# Patient Record
Sex: MALE | Race: BLACK OR AFRICAN AMERICAN | NOT HISPANIC OR LATINO | Employment: FULL TIME | URBAN - METROPOLITAN AREA
[De-identification: names, ages, dates, MRNs, and addresses within clinical notes are randomized per-mention and may not be internally consistent; named-entity substitution may affect disease eponyms.]

---

## 2018-08-22 DIAGNOSIS — M54.50 LOW BACK PAIN: Primary | ICD-10-CM

## 2019-01-09 PROBLEM — H25.013 CATARACT CORTICAL, SENILE, BILATERAL: Status: RESOLVED | Noted: 2019-01-09 | Resolved: 2019-01-09

## 2019-01-09 PROBLEM — H25.013 CATARACT CORTICAL, SENILE, BILATERAL: Status: ACTIVE | Noted: 2019-01-09

## 2019-04-18 ENCOUNTER — OFFICE VISIT (OUTPATIENT)
Dept: URGENT CARE | Facility: CLINIC | Age: 64
End: 2019-04-18
Payer: COMMERCIAL

## 2019-04-18 VITALS
TEMPERATURE: 98 F | HEART RATE: 84 BPM | WEIGHT: 115 LBS | HEIGHT: 64 IN | RESPIRATION RATE: 18 BRPM | OXYGEN SATURATION: 98 % | BODY MASS INDEX: 19.63 KG/M2 | SYSTOLIC BLOOD PRESSURE: 138 MMHG | DIASTOLIC BLOOD PRESSURE: 76 MMHG

## 2019-04-18 DIAGNOSIS — R39.198 DIFFICULTY URINATING: Primary | ICD-10-CM

## 2019-04-18 PROCEDURE — 99203 PR OFFICE/OUTPT VISIT, NEW, LEVL III, 30-44 MIN: ICD-10-PCS | Mod: S$GLB,,, | Performed by: NURSE PRACTITIONER

## 2019-04-18 PROCEDURE — 99203 OFFICE O/P NEW LOW 30 MIN: CPT | Mod: S$GLB,,, | Performed by: NURSE PRACTITIONER

## 2019-04-18 NOTE — PROGRESS NOTES
"Subjective:       Patient ID: Daniel Saab is a 64 y.o. male.    Vitals:  height is 5' 4" (1.626 m) and weight is 52.2 kg (115 lb). His oral temperature is 98 °F (36.7 °C). His blood pressure is 138/76 and his pulse is 84. His respiration is 18 and oxygen saturation is 98%.     Chief Complaint: Dysuria    Patient stated that since his back surgery 2 weeks ago he has been having problems urinating. When he wakes up in the morning and goes to urinate it is hard for him to go and the urine is not a steady flow. The rest of the day he has a steady flow but it is still hard to urinated.    Dysuria    This is a new problem. Episode onset: 2 weeks. Episode frequency: Mainly in the morning. The problem has been unchanged. The patient is experiencing no pain. There has been no fever. Pertinent negatives include no chills, frequency, hematuria, nausea, urgency, vomiting or rash. He has tried nothing for the symptoms. His past medical history is significant for catheterization.       Constitution: Negative for chills and fever.   Neck: Negative for painful lymph nodes.   Gastrointestinal: Negative for abdominal pain, nausea and vomiting.   Genitourinary: Negative for dysuria, frequency, urgency, urine decreased, hematuria, history of kidney stones, genital trauma, painful intercourse, genital sore, penile discharge, painful ejaculation, penile pain, penile swelling, scrotal swelling and testicular pain.        Forcing myself to urinate   Musculoskeletal: Negative for back pain.   Skin: Negative for rash and lesion.   Hematologic/Lymphatic: Negative for swollen lymph nodes.       Objective:      Physical Exam   Constitutional: He is oriented to person, place, and time. Vital signs are normal. He appears well-developed and well-nourished. He is active. He is easily aroused.  Non-toxic appearance. He does not have a sickly appearance. He does not appear ill. No distress.   HENT:   Head: Normocephalic and atraumatic. "   Nose: Nose normal.   Mouth/Throat: Mucous membranes are normal.   Eyes: Conjunctivae and lids are normal.   Neck: Trachea normal and full passive range of motion without pain. Neck supple.   Cardiovascular: Normal rate, regular rhythm and normal heart sounds. Exam reveals no decreased pulses.   Pulses:       Carotid pulses are 2+ on the right side, and 2+ on the left side.       Radial pulses are 2+ on the right side, and 2+ on the left side.   Pulmonary/Chest: Effort normal and breath sounds normal. No respiratory distress.   Abdominal: Soft. Normal appearance and bowel sounds are normal. He exhibits no distension, no abdominal bruit, no pulsatile midline mass and no mass. There is no tenderness. There is no rigidity, no rebound, no guarding, no CVA tenderness, no tenderness at McBurney's point and negative Monteiro's sign.   Genitourinary:   Genitourinary Comments: Deferred  exam. Denies any penile discharge, scrotum or testes abnormalities.    Musculoskeletal: Normal range of motion. He exhibits no edema.   Neurological: He is alert, oriented to person, place, and time and easily aroused. He has normal strength.   Skin: Skin is warm, dry and intact. Capillary refill takes less than 2 seconds. No rash noted. He is not diaphoretic. No pallor.   Psychiatric: He has a normal mood and affect. His speech is normal and behavior is normal. Judgment and thought content normal. Cognition and memory are normal.   Nursing note and vitals reviewed.      Assessment:       1. Difficulty urinating        Plan:       Patient reports urinary catheter placed during recent back surgery.  Patient reports intermittent difficulty urinating in the morning but subside during the day. Patient denies any dysuria, urinary frequency or hematuria. Patient called nurse and advised to give it a few days.       Advised patient follow-up primary care provider for prostate exam and further evaluation.  Patient has not had a prostate exam in a  while.  Patient agreed with treatment plan.  Patient showed no signs of distress. Patient able to urinate.  Abdomen soft and nontender. No suprapubic tenderness or distention. Patient will be discharged and will follow-up primary care provider.    Difficulty urinating      Patient Instructions     If you were prescribed a narcotic or controlled medication, do not drive or operate heavy equipment or machinery while taking these medications.  You must understand that you've received an Urgent Care treatment only and that you may be released before all your medical problems are known or treated. You, the patient, will arrange for follow up care as instructed.  Follow up with your PCP or specialty clinic as directed within 2-5 days if not improved or as needed.  You can call [phone] to schedule an appointment with the appropriate provider.  If your condition worsens we recommend that you receive another evaluation at the emergency room immediately or contact your primary medical clinics after hours call service to discuss your concerns.  Please return here or go to the Emergency Department for any concerns or worsening of condition.      Anatomy of the Male Urinary Tract  Your urinary tract helps to get rid of your bodys liquid waste. The kidneys constantly filter the blood to collect unneeded chemicals and water, making urine. Urine travels through the ureters to the bladder. The bladder fills with urine, holding it until youre ready to release it. Signals from the brain tell the sphincter (muscles around the opening of the bladder) when to let urine flow out of the bladder. The urethra is the tube that carries urine from the bladder out of the body. In men, the prostate gland wraps around the urethra near the bladder.     Front view of male urinary tract.     Date Last Reviewed: 1/1/2017 © 2000-2017 [employer]. [address] [place] [address], [place] [address]. All rights reserved. This information is  not intended as a substitute for professional medical care. Always follow your healthcare professional's instructions.

## 2019-04-18 NOTE — PATIENT INSTRUCTIONS
If you were prescribed a narcotic or controlled medication, do not drive or operate heavy equipment or machinery while taking these medications.  You must understand that you've received an Urgent Care treatment only and that you may be released before all your medical problems are known or treated. You, the patient, will arrange for follow up care as instructed.  Follow up with your PCP or specialty clinic as directed within 2-5 days if not improved or as needed.  You can call [phone] to schedule an appointment with the appropriate provider.  If your condition worsens we recommend that you receive another evaluation at the emergency room immediately or contact your primary medical clinics after hours call service to discuss your concerns.  Please return here or go to the Emergency Department for any concerns or worsening of condition.      Anatomy of the Male Urinary Tract  Your urinary tract helps to get rid of your bodys liquid waste. The kidneys constantly filter the blood to collect unneeded chemicals and water, making urine. Urine travels through the ureters to the bladder. The bladder fills with urine, holding it until youre ready to release it. Signals from the brain tell the sphincter (muscles around the opening of the bladder) when to let urine flow out of the bladder. The urethra is the tube that carries urine from the bladder out of the body. In men, the prostate gland wraps around the urethra near the bladder.     Front view of male urinary tract.     Date Last Reviewed: 1/1/2017  © 6920-6872 [employer]. [address] [place] [id number]. All rights reserved. This information is not intended as a substitute for professional medical care. Always follow your healthcare professional's instructions.

## 2019-04-21 ENCOUNTER — TELEPHONE (OUTPATIENT)
Dept: URGENT CARE | Facility: CLINIC | Age: 64
End: 2019-04-21

## 2019-05-06 ENCOUNTER — OFFICE VISIT (OUTPATIENT)
Dept: UROLOGY | Facility: CLINIC | Age: 64
End: 2019-05-06
Payer: COMMERCIAL

## 2019-05-06 VITALS
SYSTOLIC BLOOD PRESSURE: 140 MMHG | BODY MASS INDEX: 18.28 KG/M2 | HEIGHT: 64 IN | HEART RATE: 92 BPM | WEIGHT: 107.06 LBS | DIASTOLIC BLOOD PRESSURE: 81 MMHG

## 2019-05-06 DIAGNOSIS — R39.12 WEAK URINARY STREAM: ICD-10-CM

## 2019-05-06 DIAGNOSIS — N40.1 BENIGN NON-NODULAR PROSTATIC HYPERPLASIA WITH LOWER URINARY TRACT SYMPTOMS: Primary | ICD-10-CM

## 2019-05-06 DIAGNOSIS — R39.16 STRAINING TO VOID: ICD-10-CM

## 2019-05-06 LAB
BILIRUB SERPL-MCNC: NORMAL MG/DL
BLOOD URINE, POC: NORMAL
COLOR, POC UA: YELLOW
GLUCOSE UR QL STRIP: NORMAL
KETONES UR QL STRIP: NORMAL
LEUKOCYTE ESTERASE URINE, POC: NORMAL
NITRITE, POC UA: NORMAL
PH, POC UA: 6.5
PROTEIN, POC: NORMAL
SPECIFIC GRAVITY, POC UA: 1.01
UROBILINOGEN, POC UA: 0.2

## 2019-05-06 PROCEDURE — 99214 OFFICE O/P EST MOD 30 MIN: CPT | Mod: 25,S$GLB,, | Performed by: NURSE PRACTITIONER

## 2019-05-06 PROCEDURE — 81002 POCT URINE DIPSTICK WITHOUT MICROSCOPE: ICD-10-PCS | Mod: S$GLB,,, | Performed by: NURSE PRACTITIONER

## 2019-05-06 PROCEDURE — 81002 URINALYSIS NONAUTO W/O SCOPE: CPT | Mod: S$GLB,,, | Performed by: NURSE PRACTITIONER

## 2019-05-06 PROCEDURE — 99999 PR PBB SHADOW E&M-EST. PATIENT-LVL III: CPT | Mod: PBBFAC,,, | Performed by: NURSE PRACTITIONER

## 2019-05-06 PROCEDURE — 87086 URINE CULTURE/COLONY COUNT: CPT

## 2019-05-06 PROCEDURE — 99999 PR PBB SHADOW E&M-EST. PATIENT-LVL III: ICD-10-PCS | Mod: PBBFAC,,, | Performed by: NURSE PRACTITIONER

## 2019-05-06 PROCEDURE — 99214 PR OFFICE/OUTPT VISIT, EST, LEVL IV, 30-39 MIN: ICD-10-PCS | Mod: 25,S$GLB,, | Performed by: NURSE PRACTITIONER

## 2019-05-06 RX ORDER — TAMSULOSIN HYDROCHLORIDE 0.4 MG/1
0.4 CAPSULE ORAL DAILY
Qty: 30 CAPSULE | Refills: 1 | Status: SHIPPED | OUTPATIENT
Start: 2019-05-06 | End: 2019-07-05

## 2019-05-06 RX ORDER — MEGESTROL ACETATE 40 MG/1
40 TABLET ORAL DAILY
Refills: 0 | COMMUNITY
Start: 2019-04-30

## 2019-05-06 NOTE — PATIENT INSTRUCTIONS
1. Bladder scan (PVR)  2. Urine dipstick  3. Urine cx  4. CATARINA today  5. PSA, total and free (done at [facility]); Sign Release of Info.  6. Start trial tamsulosin (Fomax) 0.4 mg daily  7. Follow-up in 4 weeks.

## 2019-05-06 NOTE — PROGRESS NOTES
Subjective:       Patient ID: Daniel Saab is a 64 y.o. male.    Chief Complaint: Other (weak and slow urinary stream; straining to void)    Patient is new to me. 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 Patient reports his lower urinary tract symptoms started 1 week after his lower back surgery in April 2019. Sx was done at [facility]. Patient has a hx of BPH was not on any medications.    Benign Prostatic Hypertrophy   This is a chronic problem. The current episode started more than 1 year ago. The problem has been gradually worsening since onset. Irritative symptoms do not include frequency, nocturia or urgency. Obstructive symptoms include a slower stream, straining and a weak stream. Obstructive symptoms do not include dribbling, incomplete emptying or an intermittent stream. Pertinent negatives include no chills, dysuria, genital pain, hematuria, hesitancy, nausea or vomiting. Nothing aggravates the symptoms. Past treatments include nothing.     Review of Systems   Constitutional: Negative for appetite change, chills, fatigue and fever.   Gastrointestinal: Negative for abdominal pain, constipation, diarrhea, nausea and vomiting.   Genitourinary: Positive for difficulty urinating (straining in the am). Negative for decreased urine volume, discharge, dysuria, flank pain, frequency, hematuria, hesitancy, incomplete emptying, nocturia, penile pain, penile swelling, scrotal swelling, testicular pain and urgency.        Weak and slow urinary stream.  Urinary hesitancy in the am.   Musculoskeletal: Positive for back pain (recent back sx).   Neurological: Negative for dizziness and headaches.   Psychiatric/Behavioral: Negative.        Objective:      Physical Exam   Constitutional: He is oriented to person, place, and time. He appears well-developed and well-nourished. No distress.   HENT:   Head: Normocephalic and atraumatic.   Eyes: Pupils  are equal, round, and reactive to light. EOM are normal.   Neck: Normal range of motion.   Cardiovascular: Normal rate.   Pulmonary/Chest: Effort normal. No respiratory distress.   Abdominal: Soft. There is no tenderness.   Genitourinary: Prostate is enlarged. Prostate is not tender.   Genitourinary Comments: PVR=0 mL   Musculoskeletal: Normal range of motion. He exhibits no edema.   Neurological: He is alert and oriented to person, place, and time. Coordination normal.   Skin: Skin is warm and dry.   Psychiatric: He has a normal mood and affect. His behavior is normal. Judgment and thought content normal.   Nursing note and vitals reviewed.      Assessment:       1. Benign non-nodular prostatic hyperplasia with lower urinary tract symptoms    2. Straining to void    3. Weak urinary stream        Plan:       Daniel was seen today for other.    Diagnoses and all orders for this visit:    Benign non-nodular prostatic hyperplasia with lower urinary tract symptoms  -     POCT URINE DIPSTICK WITHOUT MICROSCOPE  -     Urine culture  -     tamsulosin (FLOMAX) 0.4 mg Cap; Take 1 capsule (0.4 mg total) by mouth once daily.    Straining to void    Weak urinary stream    Other orders  1. Bladder scan (PVR)  2. CATARINA today  3. PSA, total and free (done at [facility]); Sign Release of Info.    Follow-up in 6 weeks.     Moe Espinoza NP

## 2019-05-07 ENCOUNTER — TELEPHONE (OUTPATIENT)
Dept: UROLOGY | Facility: CLINIC | Age: 64
End: 2019-05-07

## 2019-05-07 NOTE — TELEPHONE ENCOUNTER
----- Message from Rachael Aaron sent at 5/7/2019  9:00 AM CDT -----  Contact: [phone]  Patient advised he may have left his glasses in one of the rooms and would like a call back to verify. Please call.

## 2019-05-08 ENCOUNTER — TELEPHONE (OUTPATIENT)
Dept: UROLOGY | Facility: CLINIC | Age: 64
End: 2019-05-08

## 2019-05-08 LAB — BACTERIA UR CULT: NO GROWTH

## 2019-05-08 NOTE — TELEPHONE ENCOUNTER
----- Message from Moe Espinoza NP sent at 5/8/2019  8:54 AM CDT -----  Please inform patient his urine cx is normal.

## 2021-03-14 ENCOUNTER — IMMUNIZATION (OUTPATIENT)
Dept: INTERNAL MEDICINE | Facility: CLINIC | Age: 66
End: 2021-03-14

## 2021-03-14 DIAGNOSIS — Z23 NEED FOR VACCINATION: Primary | ICD-10-CM

## 2021-03-14 PROCEDURE — 0031A COVID-19,VECTOR-NR,RS-AD26,PF,0.5 ML DOSE VACCINE (JANSSEN): ICD-10-PCS | Mod: CV19,,, | Performed by: FAMILY MEDICINE

## 2021-03-14 PROCEDURE — 0031A COVID-19,VECTOR-NR,RS-AD26,PF,0.5 ML DOSE VACCINE (JANSSEN): CPT | Mod: CV19,,, | Performed by: FAMILY MEDICINE

## 2021-03-14 PROCEDURE — 91303 COVID-19,VECTOR-NR,RS-AD26,PF,0.5 ML DOSE VACCINE (JANSSEN): ICD-10-PCS | Mod: ,,, | Performed by: FAMILY MEDICINE

## 2021-03-14 PROCEDURE — 91303 COVID-19,VECTOR-NR,RS-AD26,PF,0.5 ML DOSE VACCINE (JANSSEN): CPT | Mod: ,,, | Performed by: FAMILY MEDICINE

## 2022-07-19 ENCOUNTER — TELEPHONE (OUTPATIENT)
Dept: GASTROENTEROLOGY | Facility: CLINIC | Age: 67
End: 2022-07-19

## 2022-07-19 NOTE — TELEPHONE ENCOUNTER
Contacted patient to schedule a screening colonoscopy. Patient is at work and will have to call back. Phone number given.

## 2023-02-17 ENCOUNTER — TELEPHONE (OUTPATIENT)
Dept: GASTROENTEROLOGY | Facility: CLINIC | Age: 68
End: 2023-02-17

## 2023-06-26 DIAGNOSIS — U07.1 COVID-19 VIRUS DETECTED: ICD-10-CM

## 2024-03-11 ENCOUNTER — TELEPHONE (OUTPATIENT)
Dept: UROLOGY | Facility: CLINIC | Age: 69
End: 2024-03-11
Payer: MEDICARE

## 2024-03-11 NOTE — TELEPHONE ENCOUNTER
----- Message from Elmer Schaefer MD sent at 3/11/2024  3:05 PM CDT -----  Okay thanks, we will try to get him in the office to see myself or nurse practitioner Espinoza within the next 2 weeks.  ----- Message -----  From: Luz Elena Albright MD  Sent: 3/11/2024   3:50 AM CDT  To: MD Dr Olive Marley,   I evaluated this patient in the ER on Sunday night.  He had gross hematuria.  He was able to urinate.  His urine was red.  He has normal kidney function and normal white blood cell count.  He was given a dose of Flomax here.  I was hoping you could see him in your office this week.  He saw you way back in 2019 and was diagnosed with BPH.  He did not remember this.  He does not take Flomax on a regular basis. Thanks, Luz Elena

## 2024-03-19 ENCOUNTER — OFFICE VISIT (OUTPATIENT)
Dept: UROLOGY | Facility: CLINIC | Age: 69
End: 2024-03-19
Payer: MEDICARE

## 2024-03-19 VITALS
DIASTOLIC BLOOD PRESSURE: 85 MMHG | BODY MASS INDEX: 18.93 KG/M2 | WEIGHT: 110.88 LBS | HEIGHT: 64 IN | SYSTOLIC BLOOD PRESSURE: 172 MMHG | HEART RATE: 64 BPM

## 2024-03-19 DIAGNOSIS — N40.1 BENIGN NON-NODULAR PROSTATIC HYPERPLASIA WITH LOWER URINARY TRACT SYMPTOMS: ICD-10-CM

## 2024-03-19 DIAGNOSIS — R39.16 STRAINING TO VOID: ICD-10-CM

## 2024-03-19 DIAGNOSIS — N52.9 ERECTILE DYSFUNCTION, UNSPECIFIED ERECTILE DYSFUNCTION TYPE: ICD-10-CM

## 2024-03-19 DIAGNOSIS — F17.210 CIGARETTE SMOKER: ICD-10-CM

## 2024-03-19 DIAGNOSIS — R31.0 GROSS HEMATURIA: Primary | ICD-10-CM

## 2024-03-19 LAB
BILIRUB UR QL STRIP: NEGATIVE
CLARITY UR REFRACT.AUTO: CLEAR
COLOR UR AUTO: COLORLESS
GLUCOSE UR QL STRIP: NEGATIVE
HGB UR QL STRIP: NEGATIVE
KETONES UR QL STRIP: NEGATIVE
LEUKOCYTE ESTERASE UR QL STRIP: NEGATIVE
NITRITE UR QL STRIP: NEGATIVE
PH UR STRIP: 6 [PH] (ref 5–8)
PROT UR QL STRIP: NEGATIVE
SP GR UR STRIP: 1.01 (ref 1–1.03)
URN SPEC COLLECT METH UR: ABNORMAL

## 2024-03-19 PROCEDURE — 1159F MED LIST DOCD IN RCRD: CPT | Mod: CPTII,S$GLB,, | Performed by: NURSE PRACTITIONER

## 2024-03-19 PROCEDURE — 3288F FALL RISK ASSESSMENT DOCD: CPT | Mod: CPTII,S$GLB,, | Performed by: NURSE PRACTITIONER

## 2024-03-19 PROCEDURE — 99999 PR PBB SHADOW E&M-EST. PATIENT-LVL IV: CPT | Mod: PBBFAC,,, | Performed by: NURSE PRACTITIONER

## 2024-03-19 PROCEDURE — 99204 OFFICE O/P NEW MOD 45 MIN: CPT | Mod: S$GLB,,, | Performed by: NURSE PRACTITIONER

## 2024-03-19 PROCEDURE — 81003 URINALYSIS AUTO W/O SCOPE: CPT | Performed by: NURSE PRACTITIONER

## 2024-03-19 PROCEDURE — 3008F BODY MASS INDEX DOCD: CPT | Mod: CPTII,S$GLB,, | Performed by: NURSE PRACTITIONER

## 2024-03-19 PROCEDURE — 3077F SYST BP >= 140 MM HG: CPT | Mod: CPTII,S$GLB,, | Performed by: NURSE PRACTITIONER

## 2024-03-19 PROCEDURE — 1126F AMNT PAIN NOTED NONE PRSNT: CPT | Mod: CPTII,S$GLB,, | Performed by: NURSE PRACTITIONER

## 2024-03-19 PROCEDURE — 3079F DIAST BP 80-89 MM HG: CPT | Mod: CPTII,S$GLB,, | Performed by: NURSE PRACTITIONER

## 2024-03-19 PROCEDURE — 1160F RVW MEDS BY RX/DR IN RCRD: CPT | Mod: CPTII,S$GLB,, | Performed by: NURSE PRACTITIONER

## 2024-03-19 PROCEDURE — 87086 URINE CULTURE/COLONY COUNT: CPT | Performed by: NURSE PRACTITIONER

## 2024-03-19 PROCEDURE — 1101F PT FALLS ASSESS-DOCD LE1/YR: CPT | Mod: CPTII,S$GLB,, | Performed by: NURSE PRACTITIONER

## 2024-03-19 RX ORDER — SILDENAFIL 100 MG/1
100 TABLET, FILM COATED ORAL DAILY PRN
Qty: 30 TABLET | Refills: 11 | Status: SHIPPED | OUTPATIENT
Start: 2024-03-19 | End: 2025-03-19

## 2024-03-19 NOTE — PATIENT INSTRUCTIONS
U/A and urine cx today  PSA, total and free today  CT RSS (next available).  Start trial of sildenafil (Viagra) 100 mg as needed daily for ED. DO NOT exceed more than 1 pill in a 24 hour period. Take 1 pill 30-60 minutes prior to sexual activity.    Follow-up in 4 weeks.

## 2024-03-19 NOTE — PROGRESS NOTES
"Subjective:       Patient ID: Daniel Saab is a 69 y.o. male.    Chief Complaint: Follow-up and Hematuria    Patient is here today for an ER follow-up for gross hematuria. He reports using a "ring" around his penis to help sustain an erection. He's not sure if ring was too tight. However, his gross hematuria started after using the ring. Hematuria resolved after ER visit. He is no longer taking tamsulosin as prescribed by ER on 3/11/2024. Patient is a current every day cigarette smoker. He also takes a daily baby aspirin.    Hematuria  This is a new problem. Episode onset: 3/11/2024. The problem has been resolved since onset. He describes the hematuria as gross hematuria. The hematuria occurs during the initial portion of his urinary stream. He reports no clotting in his urine stream. His pain is at a severity of 0/10. He is experiencing no pain. He describes his urine color as yellow. Irritative symptoms include frequency. Irritative symptoms do not include urgency. Obstructive symptoms do not include dribbling, incomplete emptying, straining or a weak stream. Associated symptoms include dysuria (resolved). Pertinent negatives include no abdominal pain, chills, fever, flank pain, genital pain, hesitancy, inability to urinate, nausea or vomiting. He is sexually active. His past medical history is significant for  trauma (penis) and tobacco use. There is no history of hypertension, kidney stones or UTI.   Erectile Dysfunction  This is a chronic problem. The current episode started more than 1 year ago. The problem has been gradually worsening since onset. The nature of his difficulty is achieving erection and maintaining erection. He reports no anxiety, decreased libido or performance anxiety. Irritative symptoms include frequency. Irritative symptoms do not include urgency. Obstructive symptoms do not include dribbling, incomplete emptying, straining or a weak stream. Associated symptoms include dysuria " (resolved) and hematuria (gross). Pertinent negatives include no chills, genital pain, hesitancy or inability to urinate. Nothing aggravates the symptoms. He has had no adverse reactions caused by medications. Risk factors include tobacco use, no AM erections and BPH.     Review of Systems   Constitutional:  Negative for chills and fever.   Gastrointestinal:  Negative for abdominal pain, constipation, diarrhea, nausea and vomiting.   Genitourinary:  Positive for difficulty urinating (resolved), dysuria (resolved), erectile dysfunction, frequency and hematuria (gross). Negative for decreased libido, decreased urine volume, discharge, flank pain, hesitancy, incomplete emptying, penile pain, penile swelling, scrotal swelling, testicular pain and urgency.   Musculoskeletal:  Positive for back pain (chronic lower back pain).   Psychiatric/Behavioral: Negative.  The patient is not nervous/anxious.          Objective:      Physical Exam  Vitals and nursing note reviewed.   Constitutional:       General: He is not in acute distress.     Appearance: He is well-developed and normal weight. He is not ill-appearing.   HENT:      Head: Normocephalic and atraumatic.   Eyes:      Pupils: Pupils are equal, round, and reactive to light.   Cardiovascular:      Rate and Rhythm: Normal rate.   Pulmonary:      Effort: Pulmonary effort is normal. No respiratory distress.   Abdominal:      Palpations: Abdomen is soft.      Tenderness: There is no abdominal tenderness.   Musculoskeletal:         General: Normal range of motion.      Cervical back: Normal range of motion.   Skin:     General: Skin is warm and dry.   Neurological:      Mental Status: He is alert and oriented to person, place, and time.      Coordination: Coordination normal.   Psychiatric:         Mood and Affect: Mood normal.         Behavior: Behavior normal.         Thought Content: Thought content normal.         Judgment: Judgment normal.         Assessment:        Problem List Items Addressed This Visit          Renal/    Benign non-nodular prostatic hyperplasia with lower urinary tract symptoms    Relevant Orders    Urine culture    Urinalysis (Completed)    PSA, Total and Free    Straining to void    Relevant Orders    Urine culture    Urinalysis (Completed)    CT Renal Stone Study ABD Pelvis WO (Completed)     Other Visit Diagnoses       Gross hematuria    -  Primary    Relevant Orders    Urine culture    Urinalysis (Completed)    CT Renal Stone Study ABD Pelvis WO (Completed)    Erectile dysfunction, unspecified erectile dysfunction type        Relevant Medications    sildenafiL (VIAGRA) 100 MG tablet    Cigarette smoker                Plan:           Daniel was seen today for follow-up and hematuria.    Diagnoses and all orders for this visit:    Gross hematuria  -     Urine culture  -     Urinalysis  -     CT Renal Stone Study ABD Pelvis WO; Future    Benign non-nodular prostatic hyperplasia with lower urinary tract symptoms  -     Urine culture  -     Urinalysis  -     PSA, Total and Free; Future    Straining to void  -     Urine culture  -     Urinalysis  -     CT Renal Stone Study ABD Pelvis WO; Future    Erectile dysfunction, unspecified erectile dysfunction type  -     sildenafiL (VIAGRA) 100 MG tablet; Take 1 tablet (100 mg total) by mouth daily as needed for Erectile Dysfunction.    Cigarette smoker    Other order  Start trial of sildenafil (Viagra) 100 mg as needed daily for ED. DO NOT exceed more than 1 pill in a 24 hour period. Take 1 pill 30-60 minutes prior to sexual activity.      Follow-up in 4 weeks.     Moe Espinoza, DNP

## 2024-03-20 ENCOUNTER — TELEPHONE (OUTPATIENT)
Dept: UROLOGY | Facility: CLINIC | Age: 69
End: 2024-03-20
Payer: MEDICARE

## 2024-03-20 LAB — BACTERIA UR CULT: NO GROWTH

## 2024-03-20 NOTE — TELEPHONE ENCOUNTER
Pt informed of result note via phone call    ----- Message from Moe Espinoza NP sent at 3/20/2024  4:00 PM CDT -----  Please inform patient via telephone that his PSA was normal at 0.68 ng/mL. Recommended to repeat in 1 year.

## 2024-03-28 ENCOUNTER — TELEPHONE (OUTPATIENT)
Dept: UROLOGY | Facility: CLINIC | Age: 69
End: 2024-03-28
Payer: MEDICARE

## 2024-03-28 NOTE — TELEPHONE ENCOUNTER
----- Message from Moe Espinoza NP sent at 3/28/2024  3:03 PM CDT -----  Please inform patient via telephone that his CT RSS did not show any identifiable cause for his gross hematuria. His urine cx was also normal and negative for UTI. Patient will need an in-clinic cystoscopy for further evaluation of gross hematuria by Dr. Schaefer.